# Patient Record
Sex: MALE | HISPANIC OR LATINO | URBAN - METROPOLITAN AREA
[De-identification: names, ages, dates, MRNs, and addresses within clinical notes are randomized per-mention and may not be internally consistent; named-entity substitution may affect disease eponyms.]

---

## 2023-11-05 PROBLEM — H18.713 CORNEAL ECTASIA OF BOTH EYES: Status: ACTIVE | Noted: 2023-11-05

## 2023-11-05 PROBLEM — H52.13 BILATERAL MYOPIA: Status: ACTIVE | Noted: 2023-11-05

## 2023-11-05 PROBLEM — H18.613 KERATOCONUS, STABLE, BILATERAL: Status: ACTIVE | Noted: 2023-11-05

## 2023-11-05 PROBLEM — I48.92 ATRIAL FLUTTER (MULTI): Status: ACTIVE | Noted: 2023-11-05

## 2023-11-05 PROBLEM — I10 HYPERTENSION, ESSENTIAL: Status: ACTIVE | Noted: 2023-11-05

## 2023-11-05 PROBLEM — H16.401 CORNEAL NEOVASCULARIZATION OF RIGHT EYE: Status: ACTIVE | Noted: 2023-11-05

## 2023-11-05 PROBLEM — I25.10 ARTERIOSCLEROTIC CORONARY ARTERY DISEASE: Status: ACTIVE | Noted: 2023-11-05

## 2023-11-05 PROBLEM — H52.213 IRREGULAR ASTIGMATISM OF BOTH EYES: Status: ACTIVE | Noted: 2023-11-05

## 2023-11-05 PROBLEM — R00.2 PALPITATION: Status: ACTIVE | Noted: 2023-11-05

## 2023-11-05 RX ORDER — NITROGLYCERIN 0.4 MG/1
1 TABLET SUBLINGUAL EVERY 5 MIN PRN
COMMUNITY
Start: 2015-03-02

## 2023-11-05 RX ORDER — NAPROXEN SODIUM 220 MG/1
1 TABLET, FILM COATED ORAL DAILY
COMMUNITY
Start: 2017-10-23

## 2023-11-05 RX ORDER — LISINOPRIL 40 MG/1
2 TABLET ORAL DAILY
COMMUNITY
Start: 2015-03-20

## 2023-11-05 RX ORDER — SODIUM CHLORIDE FOR INHALATION 0.9 %
VIAL, NEBULIZER (ML) INHALATION
COMMUNITY
Start: 2022-06-01

## 2023-11-05 RX ORDER — AMLODIPINE BESYLATE 10 MG/1
1 TABLET ORAL DAILY
COMMUNITY
Start: 2017-05-19

## 2023-11-05 RX ORDER — CARVEDILOL 25 MG/1
1 TABLET ORAL 2 TIMES DAILY
COMMUNITY
Start: 2015-03-02

## 2023-11-07 ENCOUNTER — OFFICE VISIT (OUTPATIENT)
Dept: OPHTHALMOLOGY | Facility: CLINIC | Age: 48
End: 2023-11-07
Payer: COMMERCIAL

## 2023-11-07 DIAGNOSIS — H18.603 KERATOCONUS OF BOTH EYES: Primary | ICD-10-CM

## 2023-11-07 DIAGNOSIS — H52.213 IRREGULAR ASTIGMATISM OF BOTH EYES: ICD-10-CM

## 2023-11-07 DIAGNOSIS — H52.13 SEVERE MYOPIA OF BOTH EYES: ICD-10-CM

## 2023-11-07 LAB
CENTRAL CORNEA THICKNESS (OD): 438 MICRONS
CENTRAL CORNEA THICKNESS (OS): 480 MICRONS
KMAX (OD): 80.8 DIOPTERS
KMAX (OS): 73.3 DIOPTERS
PACH THINNEST (OD): 266 MICRONS
PACH THINNEST (OS): 397 MICRONS
POSTERIOR FLOAT (OS): 38 MICRONS
SIMK FLAT (OD): 65.9 DIOPTERS
SIMK FLAT (OS): 53.8 DIOPTERS
SIMK STEEP (OD): 69.9 DIOPTERS
SIMK STEEP (OS): 54 DIOPTERS
SIMK STEEP AXIS (OD): 80.8 DEGREES
SIMK STEEP AXIS (OS): 20.7 DEGREES

## 2023-11-07 PROCEDURE — V2531 CONTACT LENS GAS PERMEABLE: HCPCS | Performed by: OPTOMETRIST

## 2023-11-07 PROCEDURE — 92014 COMPRE OPH EXAM EST PT 1/>: CPT | Performed by: OPTOMETRIST

## 2023-11-07 PROCEDURE — 92310 CONTACT LENS FITTING OU: CPT | Performed by: OPTOMETRIST

## 2023-11-07 PROCEDURE — 92015 DETERMINE REFRACTIVE STATE: CPT | Performed by: OPTOMETRIST

## 2023-11-07 PROCEDURE — 92025 CPTRIZED CORNEAL TOPOGRAPHY: CPT | Performed by: OPTOMETRIST

## 2023-11-07 PROCEDURE — V2599 CONTACT LENS/ES OTHER TYPE: HCPCS | Performed by: OPTOMETRIST

## 2023-11-07 ASSESSMENT — TONOMETRY
OD_IOP_MMHG: 12
OS_IOP_MMHG: 12
IOP_METHOD: GOLDMANN APPLANATION

## 2023-11-07 ASSESSMENT — REFRACTION_CURRENTRX
OS_DIAMETER: 17.0
OD_BASECURVE: 8.00
OS_BRAND: SYNERGEYES VS
OD_AXIS: 062
OS_SPHERE: -3.50
OD_BRAND: SYNERGEYES VS
OS_BASECURVE: 8.00
OD_CYLINDER: -1.00
OD_DIAMETER: 17.0
OD_SPHERE: -4.75

## 2023-11-07 ASSESSMENT — VISUAL ACUITY
VA_OR_OS_CURRENT_RX: 20/25
OD_CC: 20/25-1
VA_OR_OD_CURRENT_RX: 20/20-2
OS_CC: 20/25-1
CORRECTION_TYPE: CONTACTS
METHOD: SNELLEN - LINEAR

## 2023-11-07 ASSESSMENT — REFRACTION_MANIFEST
OS_CYLINDER: -3.00
OD_SPHERE: -12.50
OD_AXIS: 135
OS_SPHERE: -13.50
OS_AXIS: 030
OD_CYLINDER: -2.50

## 2023-11-07 ASSESSMENT — CONF VISUAL FIELD
OD_SUPERIOR_TEMPORAL_RESTRICTION: 0
METHOD: COUNTING FINGERS
OS_INFERIOR_NASAL_RESTRICTION: 0
OD_INFERIOR_TEMPORAL_RESTRICTION: 0
OS_INFERIOR_TEMPORAL_RESTRICTION: 0
OD_INFERIOR_NASAL_RESTRICTION: 0
OS_SUPERIOR_TEMPORAL_RESTRICTION: 0
OD_NORMAL: 1
OD_SUPERIOR_NASAL_RESTRICTION: 0
OS_SUPERIOR_NASAL_RESTRICTION: 0
OS_NORMAL: 1

## 2023-11-07 ASSESSMENT — CUP TO DISC RATIO
OS_RATIO: 0.3
OD_RATIO: 0.3

## 2023-11-07 ASSESSMENT — SLIT LAMP EXAM - LIDS
COMMENTS: NORMAL
COMMENTS: NORMAL

## 2023-11-07 ASSESSMENT — EXTERNAL EXAM - LEFT EYE: OS_EXAM: NORMAL

## 2023-11-07 ASSESSMENT — ENCOUNTER SYMPTOMS: EYES NEGATIVE: 1

## 2023-11-07 ASSESSMENT — EXTERNAL EXAM - RIGHT EYE: OD_EXAM: NORMAL

## 2023-11-07 NOTE — PROGRESS NOTES
Assessment/Plan   Diagnoses and all orders for this visit:  Keratoconus of both eyes  -     Corneal Topography - OU - Both Eyes  Irregular astigmatism of both eyes  Severe myopia of both eyes  Ocular health of posterior segment WNL both eyes (OU) for age. Slight progression of KCN both eyes (OU), pt now out of tx parameters for CXL OS. Will order new sclerals w/ adjusted power OD. Mail to pt. Pt to RTC for fup appt.   Patient to return for CL PU appt. At next visit check entering VA, then checked for VA (over refract) and fit. (Ant seg OCT over lens) If all is acceptable will then proceed with dispense/finalization.

## 2023-11-08 ENCOUNTER — DOCUMENTATION (OUTPATIENT)
Dept: OPHTHALMOLOGY | Facility: CLINIC | Age: 48
End: 2023-11-08
Payer: COMMERCIAL

## 2023-11-08 ASSESSMENT — REFRACTION_CURRENTRX
OS_BRAND: SYNERGEYES VS
OS_DIAMETER: 17.0
OS_DIAMETER: 17.0
OD_DIAMETER: 17.0
OD_CYLINDER: -1.50
OD_BRAND: SYNERGEYES VS
OS_BASECURVE: 8.00
OD_BASECURVE: 8.00
OD_AXIS: 062
OD_SPHERE: -4.75
OD_BASECURVE: 8.00
OS_BASECURVE: 8.00
OD_CYLINDER: -1.00
OD_SPHERE: -4.75
OD_AXIS: 062
OS_SPHERE: -3.50
OD_BRAND: SYNERGEYES VS
OD_DIAMETER: 17.0
OS_SPHERE: -3.50
OS_BRAND: SYNERGEYES VS

## 2024-07-15 ENCOUNTER — TELEMEDICINE (OUTPATIENT)
Dept: PRIMARY CARE | Facility: CLINIC | Age: 49
End: 2024-07-15
Payer: COMMERCIAL

## 2024-07-15 DIAGNOSIS — I25.10 ARTERIOSCLEROTIC CORONARY ARTERY DISEASE: ICD-10-CM

## 2024-07-15 DIAGNOSIS — I10 HYPERTENSION, UNSPECIFIED TYPE: Primary | ICD-10-CM

## 2024-07-15 PROCEDURE — 99213 OFFICE O/P EST LOW 20 MIN: CPT | Performed by: PHYSICIAN ASSISTANT

## 2024-07-15 RX ORDER — NAPROXEN SODIUM 220 MG/1
81 TABLET, FILM COATED ORAL DAILY
Qty: 90 TABLET | Refills: 0 | Status: SHIPPED | OUTPATIENT
Start: 2024-07-15

## 2024-07-15 RX ORDER — LISINOPRIL 40 MG/1
80 TABLET ORAL DAILY
Qty: 180 TABLET | Refills: 0 | Status: SHIPPED | OUTPATIENT
Start: 2024-07-15

## 2024-07-15 RX ORDER — NITROGLYCERIN 0.4 MG/1
0.4 TABLET SUBLINGUAL EVERY 5 MIN PRN
Qty: 90 TABLET | Refills: 0 | Status: SHIPPED | OUTPATIENT
Start: 2024-07-15

## 2024-07-15 RX ORDER — AMLODIPINE BESYLATE 10 MG/1
10 TABLET ORAL DAILY
Qty: 90 TABLET | Refills: 0 | Status: SHIPPED | OUTPATIENT
Start: 2024-07-15

## 2024-07-15 RX ORDER — CARVEDILOL 25 MG/1
25 TABLET ORAL 2 TIMES DAILY
Qty: 180 TABLET | Refills: 0 | Status: SHIPPED | OUTPATIENT
Start: 2024-07-15

## 2024-07-15 NOTE — PROGRESS NOTES
An interactive audio and video telecommunication system which permits real time communications between the patient (at the originating site) and provider (at the distant site) was utilized to provide this telehealth service.   Verbal consent was requested and obtained from Tim Parikh on this date, 07/15/24 for a telehealth visit.     Subjective    Tim Parikh is a 49 y.o. year old male patient presenting for virtual visit   Chief Complaint   Patient presents with    Med Refill      [facility]  Needs BP meds need refilled   His doctor left and he needs to establish with new PCP. Unsure how. Has 10 days of meds left.     Sending PCP referral    Patient Active Problem List   Diagnosis    Arteriosclerotic coronary artery disease    Atrial flutter (Multi)    Bilateral myopia    Corneal ectasia of both eyes    Corneal neovascularization of right eye    Hypertension, essential    Irregular astigmatism of both eyes    Keratoconus, stable, bilateral    Palpitation       Past Medical History:   Diagnosis Date    Atherosclerotic heart disease of native coronary artery without angina pectoris 04/15/2021    Arteriosclerotic coronary artery disease    Essential (primary) hypertension 04/16/2020    Hypertension    Keratoconus, stable, bilateral 09/12/2022    Keratoconus, stable, bilateral    Personal history of other diseases of the circulatory system 06/17/2015    History of atrial fibrillation    Personal history of other diseases of the circulatory system 05/19/2017    History of cardiomyopathy    Unspecified atrial flutter (Multi) 04/15/2021    Atrial flutter      Past Surgical History:   Procedure Laterality Date    LASIK        Family History   Problem Relation Name Age of Onset    No Known Problems Mother      Diabetes Father      Heart failure Father        Social History     Tobacco Use    Smoking status: Never    Smokeless tobacco: Not on file   Substance Use Topics    Alcohol use: Not on file        Current Outpatient  Medications:     amLODIPine (Norvasc) 10 mg tablet, Take 1 tablet (10 mg) by mouth once daily., Disp: 90 tablet, Rfl: 0    aspirin 81 mg chewable tablet, Chew 1 tablet (81 mg) once daily., Disp: 90 tablet, Rfl: 0    carvedilol (Coreg) 25 mg tablet, Take 1 tablet (25 mg) by mouth 2 times a day., Disp: 180 tablet, Rfl: 0    lisinopril 40 mg tablet, Take 2 tablets (80 mg) by mouth once daily., Disp: 180 tablet, Rfl: 0    nitroglycerin (Nitrostat) 0.4 mg SL tablet, Place 1 tablet (0.4 mg) under the tongue every 5 minutes if needed for chest pain (FOR UP TO 3 DOSES AS NEEDED FOR CHEST PAIN).  CALL 911 IF PAIN PERSISTS., Disp: 90 tablet, Rfl: 0    sodium chloride 0.9 % nebulizer solution, Inhale. FOR UP TO 3 DOSES AS NEEDED FOR CHEST PAIN, Disp: , Rfl:      Review of Systems    Review of Systems:   Constitutional: Denies fever  HEENT: Denies ST, earache  CVS: Denies Chest pain  Pulmonary: Denies wheezing, SOB  GI: Denies N/V  : Denies dysuria  Musculoskeletal:  Denies myalgia  Neuro: Denies focal weakness or numbness.  Skin: Denies Rashes.  *Review of Systems is negative unless otherwise mentioned in HPI or ROS above.     Objective    VITALS  Pt does not have vitals available at time of visit.    Exam       Limited physical exam in virtual platform  Nontoxic. No acute distress.  Nonlabored breathing.    Assessment/Plan      Problem List Items Addressed This Visit       Arteriosclerotic coronary artery disease    Relevant Medications    aspirin 81 mg chewable tablet    amLODIPine (Norvasc) 10 mg tablet    nitroglycerin (Nitrostat) 0.4 mg SL tablet    carvedilol (Coreg) 25 mg tablet     Other Visit Diagnoses       Hypertension, unspecified type    -  Primary    Relevant Medications    amLODIPine (Norvasc) 10 mg tablet    carvedilol (Coreg) 25 mg tablet    lisinopril 40 mg tablet    Other Relevant Orders    Referral to Primary Care

## 2025-02-04 ENCOUNTER — APPOINTMENT (OUTPATIENT)
Dept: OPHTHALMOLOGY | Facility: CLINIC | Age: 50
End: 2025-02-04
Payer: COMMERCIAL

## 2025-02-04 DIAGNOSIS — H52.13 SEVERE MYOPIA OF BOTH EYES: ICD-10-CM

## 2025-02-04 DIAGNOSIS — H52.213 IRREGULAR ASTIGMATISM OF BOTH EYES: Primary | ICD-10-CM

## 2025-02-04 DIAGNOSIS — H16.401 CORNEAL NEOVASCULARIZATION OF RIGHT EYE: ICD-10-CM

## 2025-02-04 DIAGNOSIS — H18.603 KERATOCONUS OF BOTH EYES: ICD-10-CM

## 2025-02-04 DIAGNOSIS — H18.603 KERATOCONUS OF BOTH EYES: Primary | ICD-10-CM

## 2025-02-04 LAB
CENTRAL CORNEA THICKNESS (OD): NORMAL MICRONS
CENTRAL CORNEA THICKNESS (OS): NORMAL MICRONS
KMAX (OD): NORMAL DIOPTERS
KMAX (OS): NORMAL DIOPTERS
PACH THINNEST (OD): 276 MICRONS
PACH THINNEST (OS): 377 MICRONS
POSTERIOR FLOAT (OD): -19 MICRONS
POSTERIOR FLOAT (OS): -18 MICRONS
SIMK FLAT (OD): NORMAL DIOPTERS
SIMK FLAT (OS): NORMAL DIOPTERS
SIMK STEEP (OD): NORMAL DIOPTERS
SIMK STEEP (OS): NORMAL DIOPTERS
SIMK STEEP AXIS (OD): 45.4 DEGREES
SIMK STEEP AXIS (OS): 134.2 DEGREES

## 2025-02-04 PROCEDURE — V2531 CONTACT LENS GAS PERMEABLE: HCPCS | Performed by: OPTOMETRIST

## 2025-02-04 PROCEDURE — 92014 COMPRE OPH EXAM EST PT 1/>: CPT | Performed by: OPTOMETRIST

## 2025-02-04 PROCEDURE — 92072 FITG C-LENS KERATOCONUS 1ST: CPT | Performed by: OPTOMETRIST

## 2025-02-04 RX ORDER — PREDNISOLONE ACETATE 10 MG/ML
1 SUSPENSION/ DROPS OPHTHALMIC 2 TIMES DAILY
Qty: 5 ML | Refills: 0 | Status: SHIPPED | OUTPATIENT
Start: 2025-02-04 | End: 2025-02-18

## 2025-02-04 RX ORDER — SODIUM CHLORIDE FOR INHALATION 0.9 %
3 VIAL, NEBULIZER (ML) INHALATION 2 TIMES DAILY
Qty: 90 ML | Refills: 10 | Status: SHIPPED | OUTPATIENT
Start: 2025-02-04 | End: 2025-02-04 | Stop reason: SDUPTHER

## 2025-02-04 RX ORDER — SODIUM CHLORIDE FOR INHALATION 0.9 %
3 VIAL, NEBULIZER (ML) INHALATION 2 TIMES DAILY
Qty: 90 ML | Refills: 10 | Status: SHIPPED | OUTPATIENT
Start: 2025-02-04 | End: 2025-02-06 | Stop reason: SDUPTHER

## 2025-02-04 ASSESSMENT — CONF VISUAL FIELD
OS_SUPERIOR_TEMPORAL_RESTRICTION: 0
OS_NORMAL: 1
OD_SUPERIOR_TEMPORAL_RESTRICTION: 0
OD_SUPERIOR_NASAL_RESTRICTION: 0
METHOD: COUNTING FINGERS
OD_INFERIOR_NASAL_RESTRICTION: 0
OS_INFERIOR_TEMPORAL_RESTRICTION: 0
OD_NORMAL: 1
OD_INFERIOR_TEMPORAL_RESTRICTION: 0
OS_SUPERIOR_NASAL_RESTRICTION: 0
OS_INFERIOR_NASAL_RESTRICTION: 0

## 2025-02-04 ASSESSMENT — REFRACTION_MANIFEST
OS_SPHERE: -14.50
OD_CYLINDER: -2.50
OS_CYLINDER: -5.50
OD_SPHERE: -12.50
OD_AXIS: 135
OS_AXIS: 120

## 2025-02-04 ASSESSMENT — CUP TO DISC RATIO
OD_RATIO: 0.5
OS_RATIO: 0.4

## 2025-02-04 ASSESSMENT — SLIT LAMP EXAM - LIDS
COMMENTS: NORMAL
COMMENTS: NORMAL

## 2025-02-04 ASSESSMENT — VISUAL ACUITY
METHOD: SNELLEN - LINEAR
OD_CC: 20/20
OD_CC+: -1
OS_CC+: -3
OS_CC: 20/20
CORRECTION_TYPE: CONTACTS

## 2025-02-04 ASSESSMENT — REFRACTION_WEARINGRX
OS_AXIS: 030
OD_CYLINDER: -2.50
OS_CYLINDER: -3.00
OD_AXIS: 135
OS_SPHERE: -13.50
OD_SPHERE: -12.50

## 2025-02-04 ASSESSMENT — ENCOUNTER SYMPTOMS
ALLERGIC/IMMUNOLOGIC NEGATIVE: 0
CONSTITUTIONAL NEGATIVE: 0
PSYCHIATRIC NEGATIVE: 0
CARDIOVASCULAR NEGATIVE: 0
EYES NEGATIVE: 1
MUSCULOSKELETAL NEGATIVE: 0
GASTROINTESTINAL NEGATIVE: 0
ENDOCRINE NEGATIVE: 0
HEMATOLOGIC/LYMPHATIC NEGATIVE: 0
RESPIRATORY NEGATIVE: 0
NEUROLOGICAL NEGATIVE: 0

## 2025-02-04 ASSESSMENT — TONOMETRY
IOP_METHOD: GOLDMANN APPLANATION
OS_IOP_MMHG: 13
OD_IOP_MMHG: 14

## 2025-02-04 ASSESSMENT — EXTERNAL EXAM - RIGHT EYE: OD_EXAM: NORMAL

## 2025-02-04 ASSESSMENT — EXTERNAL EXAM - LEFT EYE: OS_EXAM: NORMAL

## 2025-02-04 NOTE — PROGRESS NOTES
Assessment/Plan   Diagnoses and all orders for this visit:  Irregular astigmatism of both eyes  Severe myopia of both eyes  Keratoconus of both eyes  Patient to return for CL F/up appt. At next visit check entering VA, checked for bubble, then checked for VA (over refract) and fit. If all is acceptable will then proceed with dispense.    Ocular health WNL OU. Ok fit of current lenses but excessive clearance OD may be exacerbating the corneal mandy. Monitor at next. (Lenses will be mailed to patient and he will return for f/up).  Cost of fit and lenses billed as medically necessary today, ABN signed.    Corneal neovascularization of right eye  -     prednisoLONE acetate (Pred-Forte) 1 % ophthalmic suspension; Administer 1 drop into the right eye 2 times a day for 14 days.  Discussed. Start pred ace bid OD x 2 weeks then stop d/t corneal mandy, previously documented but appears worse today. Monitor at next.

## 2025-02-05 ENCOUNTER — TELEPHONE (OUTPATIENT)
Dept: OPHTHALMOLOGY | Facility: CLINIC | Age: 50
End: 2025-02-05
Payer: COMMERCIAL

## 2025-02-06 DIAGNOSIS — H18.603 KERATOCONUS OF BOTH EYES: ICD-10-CM

## 2025-02-06 RX ORDER — SODIUM CHLORIDE FOR INHALATION 0.9 %
3 VIAL, NEBULIZER (ML) INHALATION 2 TIMES DAILY
Qty: 540 ML | Refills: 3 | Status: SHIPPED | OUTPATIENT
Start: 2025-02-06 | End: 2026-02-06

## 2025-02-06 ASSESSMENT — REFRACTION_CURRENTRX
OS_SPHERE: -3.50
OD_DIAMETER: 17.0
OD_SPHERE: -4.75
OS_BASECURVE: 7.60
OS_BRAND: SYNERGEYES VS
OS_BASECURVE: 8.00
OD_DIAMETER: 17.0
OD_CYLINDER: -1.00
OD_BRAND: SYNERGEYES VS
OD_SPHERE: -6.75
OD_BASECURVE: 8.00
OD_BASECURVE: 7.60
OD_AXIS: 062
OS_DIAMETER: 17.0
OD_CYLINDER: -1.50
OS_SPHERE: -5.50
OD_AXIS: 062
OS_DIAMETER: 17.0
OS_BRAND: SYNERGEYES VS
OD_BRAND: SYNERGEYES VS

## 2025-02-06 ASSESSMENT — VISUAL ACUITY
VA_OR_OS_CURRENT_RX: 20/20
VA_OR_OD_CURRENT_RX: 20/20